# Patient Record
Sex: FEMALE | URBAN - METROPOLITAN AREA
[De-identification: names, ages, dates, MRNs, and addresses within clinical notes are randomized per-mention and may not be internally consistent; named-entity substitution may affect disease eponyms.]

---
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
## 2025-02-24 NOTE — HPI: WART (PATIENT REPORTED)
Where Is Your Wart Located?: [address]
List Over The Counter Wart Treatments You Have Tried In The Past (Separate Each Name With A Comma):: Wart pads
24-Feb-2025 19:32
24-Feb-2025 19:34